# Patient Record
Sex: FEMALE | Race: WHITE | NOT HISPANIC OR LATINO | Employment: OTHER | URBAN - METROPOLITAN AREA
[De-identification: names, ages, dates, MRNs, and addresses within clinical notes are randomized per-mention and may not be internally consistent; named-entity substitution may affect disease eponyms.]

---

## 2023-05-02 ENCOUNTER — APPOINTMENT (OUTPATIENT)
Dept: GENERAL RADIOLOGY | Facility: CLINIC | Age: 43
End: 2023-05-02
Attending: EMERGENCY MEDICINE

## 2023-05-02 ENCOUNTER — HOSPITAL ENCOUNTER (EMERGENCY)
Facility: CLINIC | Age: 43
Discharge: HOME | End: 2023-05-03
Attending: EMERGENCY MEDICINE | Admitting: EMERGENCY MEDICINE

## 2023-05-02 DIAGNOSIS — I10 HYPERTENSION, UNSPECIFIED TYPE: ICD-10-CM

## 2023-05-02 DIAGNOSIS — R42 DIZZINESS: ICD-10-CM

## 2023-05-02 DIAGNOSIS — N30.01 ACUTE CYSTITIS WITH HEMATURIA: ICD-10-CM

## 2023-05-02 LAB
BASOPHILS # BLD AUTO: 0 10E3/UL (ref 0–0.2)
BASOPHILS NFR BLD AUTO: 0 %
EOSINOPHIL # BLD AUTO: 0.2 10E3/UL (ref 0–0.7)
EOSINOPHIL NFR BLD AUTO: 1 %
ERYTHROCYTE [DISTWIDTH] IN BLOOD BY AUTOMATED COUNT: 14.6 % (ref 10–15)
HCT VFR BLD AUTO: 37.8 % (ref 35–47)
HGB BLD-MCNC: 12.2 G/DL (ref 11.7–15.7)
IMM GRANULOCYTES # BLD: 0.1 10E3/UL
IMM GRANULOCYTES NFR BLD: 0 %
LYMPHOCYTES # BLD AUTO: 3.1 10E3/UL (ref 0.8–5.3)
LYMPHOCYTES NFR BLD AUTO: 19 %
MCH RBC QN AUTO: 25.3 PG (ref 26.5–33)
MCHC RBC AUTO-ENTMCNC: 32.3 G/DL (ref 31.5–36.5)
MCV RBC AUTO: 78 FL (ref 78–100)
MONOCYTES # BLD AUTO: 0.7 10E3/UL (ref 0–1.3)
MONOCYTES NFR BLD AUTO: 4 %
NEUTROPHILS # BLD AUTO: 12.6 10E3/UL (ref 1.6–8.3)
NEUTROPHILS NFR BLD AUTO: 76 %
NRBC # BLD AUTO: 0 10E3/UL
NRBC BLD AUTO-RTO: 0 /100
PLATELET # BLD AUTO: 354 10E3/UL (ref 150–450)
RBC # BLD AUTO: 4.83 10E6/UL (ref 3.8–5.2)
WBC # BLD AUTO: 16.7 10E3/UL (ref 4–11)

## 2023-05-02 PROCEDURE — 99285 EMERGENCY DEPT VISIT HI MDM: CPT | Mod: CS | Performed by: EMERGENCY MEDICINE

## 2023-05-02 PROCEDURE — 93005 ELECTROCARDIOGRAM TRACING: CPT | Performed by: EMERGENCY MEDICINE

## 2023-05-02 PROCEDURE — 83880 ASSAY OF NATRIURETIC PEPTIDE: CPT | Performed by: EMERGENCY MEDICINE

## 2023-05-02 PROCEDURE — 85025 COMPLETE CBC W/AUTO DIFF WBC: CPT | Performed by: EMERGENCY MEDICINE

## 2023-05-02 PROCEDURE — 71046 X-RAY EXAM CHEST 2 VIEWS: CPT | Mod: 26 | Performed by: RADIOLOGY

## 2023-05-02 PROCEDURE — 85379 FIBRIN DEGRADATION QUANT: CPT | Performed by: EMERGENCY MEDICINE

## 2023-05-02 PROCEDURE — 96374 THER/PROPH/DIAG INJ IV PUSH: CPT | Mod: 59 | Performed by: EMERGENCY MEDICINE

## 2023-05-02 PROCEDURE — 99285 EMERGENCY DEPT VISIT HI MDM: CPT | Mod: CS,25 | Performed by: EMERGENCY MEDICINE

## 2023-05-02 PROCEDURE — 87637 SARSCOV2&INF A&B&RSV AMP PRB: CPT | Performed by: EMERGENCY MEDICINE

## 2023-05-02 PROCEDURE — 71046 X-RAY EXAM CHEST 2 VIEWS: CPT

## 2023-05-02 PROCEDURE — C9803 HOPD COVID-19 SPEC COLLECT: HCPCS | Performed by: EMERGENCY MEDICINE

## 2023-05-02 PROCEDURE — 83735 ASSAY OF MAGNESIUM: CPT | Performed by: EMERGENCY MEDICINE

## 2023-05-02 PROCEDURE — 250N000011 HC RX IP 250 OP 636: Performed by: EMERGENCY MEDICINE

## 2023-05-02 PROCEDURE — 36415 COLL VENOUS BLD VENIPUNCTURE: CPT | Performed by: EMERGENCY MEDICINE

## 2023-05-02 PROCEDURE — 84484 ASSAY OF TROPONIN QUANT: CPT | Performed by: EMERGENCY MEDICINE

## 2023-05-02 PROCEDURE — 80053 COMPREHEN METABOLIC PANEL: CPT | Performed by: EMERGENCY MEDICINE

## 2023-05-02 PROCEDURE — 93010 ELECTROCARDIOGRAM REPORT: CPT | Performed by: EMERGENCY MEDICINE

## 2023-05-02 RX ORDER — LABETALOL HYDROCHLORIDE 5 MG/ML
10 INJECTION, SOLUTION INTRAVENOUS ONCE
Status: COMPLETED | OUTPATIENT
Start: 2023-05-02 | End: 2023-05-02

## 2023-05-02 RX ADMIN — LABETALOL HYDROCHLORIDE 10 MG: 5 INJECTION, SOLUTION INTRAVENOUS at 23:39

## 2023-05-02 ASSESSMENT — ACTIVITIES OF DAILY LIVING (ADL): ADLS_ACUITY_SCORE: 35

## 2023-05-03 ENCOUNTER — APPOINTMENT (OUTPATIENT)
Dept: CT IMAGING | Facility: CLINIC | Age: 43
End: 2023-05-03
Attending: EMERGENCY MEDICINE

## 2023-05-03 ENCOUNTER — APPOINTMENT (OUTPATIENT)
Dept: MRI IMAGING | Facility: CLINIC | Age: 43
End: 2023-05-03

## 2023-05-03 VITALS
SYSTOLIC BLOOD PRESSURE: 176 MMHG | HEART RATE: 101 BPM | TEMPERATURE: 98.4 F | RESPIRATION RATE: 16 BRPM | HEIGHT: 63 IN | OXYGEN SATURATION: 97 % | WEIGHT: 220 LBS | DIASTOLIC BLOOD PRESSURE: 120 MMHG | BODY MASS INDEX: 38.98 KG/M2

## 2023-05-03 LAB
ALBUMIN SERPL BCG-MCNC: 4.1 G/DL (ref 3.5–5.2)
ALBUMIN UR-MCNC: NEGATIVE MG/DL
ALP SERPL-CCNC: 80 U/L (ref 35–104)
ALT SERPL W P-5'-P-CCNC: 16 U/L (ref 10–35)
ANION GAP SERPL CALCULATED.3IONS-SCNC: 11 MMOL/L (ref 7–15)
APPEARANCE UR: CLEAR
AST SERPL W P-5'-P-CCNC: 15 U/L (ref 10–35)
ATRIAL RATE - MUSE: 105 BPM
BILIRUB SERPL-MCNC: 0.2 MG/DL
BILIRUB UR QL STRIP: NEGATIVE
BUN SERPL-MCNC: 14.3 MG/DL (ref 6–20)
CALCIUM SERPL-MCNC: 9.4 MG/DL (ref 8.6–10)
CHLORIDE SERPL-SCNC: 105 MMOL/L (ref 98–107)
COLOR UR AUTO: ABNORMAL
CREAT SERPL-MCNC: 0.52 MG/DL (ref 0.51–0.95)
D DIMER PPP FEU-MCNC: 0.32 UG/ML FEU (ref 0–0.5)
DEPRECATED HCO3 PLAS-SCNC: 24 MMOL/L (ref 22–29)
DIASTOLIC BLOOD PRESSURE - MUSE: NORMAL MMHG
FLUAV RNA SPEC QL NAA+PROBE: NEGATIVE
FLUBV RNA RESP QL NAA+PROBE: NEGATIVE
GFR SERPL CREATININE-BSD FRML MDRD: >90 ML/MIN/1.73M2
GLUCOSE SERPL-MCNC: 116 MG/DL (ref 70–99)
GLUCOSE UR STRIP-MCNC: NEGATIVE MG/DL
HGB UR QL STRIP: ABNORMAL
INTERPRETATION ECG - MUSE: NORMAL
KETONES UR STRIP-MCNC: NEGATIVE MG/DL
LEUKOCYTE ESTERASE UR QL STRIP: ABNORMAL
MAGNESIUM SERPL-MCNC: 2 MG/DL (ref 1.7–2.3)
NITRATE UR QL: NEGATIVE
NT-PROBNP SERPL-MCNC: 68 PG/ML (ref 0–450)
P AXIS - MUSE: 48 DEGREES
PH UR STRIP: 5.5 [PH] (ref 5–7)
POTASSIUM SERPL-SCNC: 3.3 MMOL/L (ref 3.4–5.3)
PR INTERVAL - MUSE: 172 MS
PROT SERPL-MCNC: 7.5 G/DL (ref 6.4–8.3)
QRS DURATION - MUSE: 86 MS
QT - MUSE: 340 MS
QTC - MUSE: 449 MS
R AXIS - MUSE: 12 DEGREES
RBC URINE: 17 /HPF
RSV RNA SPEC NAA+PROBE: NEGATIVE
SARS-COV-2 RNA RESP QL NAA+PROBE: NEGATIVE
SODIUM SERPL-SCNC: 140 MMOL/L (ref 136–145)
SP GR UR STRIP: 1.02 (ref 1–1.03)
SQUAMOUS EPITHELIAL: 1 /HPF
SYSTOLIC BLOOD PRESSURE - MUSE: NORMAL MMHG
T AXIS - MUSE: 47 DEGREES
TRANSITIONAL EPI: <1 /HPF
TROPONIN T SERPL HS-MCNC: 6 NG/L
TROPONIN T SERPL HS-MCNC: <6 NG/L
UROBILINOGEN UR STRIP-MCNC: NORMAL MG/DL
VENTRICULAR RATE- MUSE: 105 BPM
WBC URINE: 40 /HPF

## 2023-05-03 PROCEDURE — 70450 CT HEAD/BRAIN W/O DYE: CPT

## 2023-05-03 PROCEDURE — 70498 CT ANGIOGRAPHY NECK: CPT | Mod: 26 | Performed by: STUDENT IN AN ORGANIZED HEALTH CARE EDUCATION/TRAINING PROGRAM

## 2023-05-03 PROCEDURE — 36415 COLL VENOUS BLD VENIPUNCTURE: CPT | Performed by: EMERGENCY MEDICINE

## 2023-05-03 PROCEDURE — 70551 MRI BRAIN STEM W/O DYE: CPT | Mod: 26 | Performed by: RADIOLOGY

## 2023-05-03 PROCEDURE — 70496 CT ANGIOGRAPHY HEAD: CPT | Mod: 26 | Performed by: STUDENT IN AN ORGANIZED HEALTH CARE EDUCATION/TRAINING PROGRAM

## 2023-05-03 PROCEDURE — 81001 URINALYSIS AUTO W/SCOPE: CPT | Performed by: EMERGENCY MEDICINE

## 2023-05-03 PROCEDURE — A9585 GADOBUTROL INJECTION: HCPCS | Performed by: EMERGENCY MEDICINE

## 2023-05-03 PROCEDURE — 250N000009 HC RX 250: Performed by: EMERGENCY MEDICINE

## 2023-05-03 PROCEDURE — 70549 MR ANGIOGRAPH NECK W/O&W/DYE: CPT | Mod: 26 | Performed by: RADIOLOGY

## 2023-05-03 PROCEDURE — 250N000013 HC RX MED GY IP 250 OP 250 PS 637: Performed by: EMERGENCY MEDICINE

## 2023-05-03 PROCEDURE — 255N000002 HC RX 255 OP 636: Performed by: EMERGENCY MEDICINE

## 2023-05-03 PROCEDURE — 70496 CT ANGIOGRAPHY HEAD: CPT

## 2023-05-03 PROCEDURE — 250N000011 HC RX IP 250 OP 636: Performed by: EMERGENCY MEDICINE

## 2023-05-03 PROCEDURE — 87186 SC STD MICRODIL/AGAR DIL: CPT | Performed by: EMERGENCY MEDICINE

## 2023-05-03 PROCEDURE — 84484 ASSAY OF TROPONIN QUANT: CPT | Performed by: EMERGENCY MEDICINE

## 2023-05-03 PROCEDURE — 70551 MRI BRAIN STEM W/O DYE: CPT

## 2023-05-03 PROCEDURE — 70544 MR ANGIOGRAPHY HEAD W/O DYE: CPT

## 2023-05-03 PROCEDURE — 70498 CT ANGIOGRAPHY NECK: CPT

## 2023-05-03 PROCEDURE — 70549 MR ANGIOGRAPH NECK W/O&W/DYE: CPT

## 2023-05-03 RX ORDER — POTASSIUM CHLORIDE 1.5 G/1.58G
20 POWDER, FOR SOLUTION ORAL ONCE
Status: COMPLETED | OUTPATIENT
Start: 2023-05-03 | End: 2023-05-03

## 2023-05-03 RX ORDER — IOPAMIDOL 755 MG/ML
75 INJECTION, SOLUTION INTRAVASCULAR ONCE
Status: COMPLETED | OUTPATIENT
Start: 2023-05-03 | End: 2023-05-03

## 2023-05-03 RX ORDER — NITROFURANTOIN 25; 75 MG/1; MG/1
100 CAPSULE ORAL ONCE
Status: COMPLETED | OUTPATIENT
Start: 2023-05-03 | End: 2023-05-03

## 2023-05-03 RX ORDER — HYDROCHLOROTHIAZIDE 25 MG/1
25 TABLET ORAL DAILY
COMMUNITY

## 2023-05-03 RX ORDER — GADOBUTROL 604.72 MG/ML
10 INJECTION INTRAVENOUS ONCE
Status: COMPLETED | OUTPATIENT
Start: 2023-05-03 | End: 2023-05-03

## 2023-05-03 RX ORDER — NITROFURANTOIN 25; 75 MG/1; MG/1
100 CAPSULE ORAL 2 TIMES DAILY
Qty: 10 CAPSULE | Refills: 0 | Status: SHIPPED | OUTPATIENT
Start: 2023-05-03 | End: 2023-05-08

## 2023-05-03 RX ORDER — HYDROCHLOROTHIAZIDE 25 MG/1
25 TABLET ORAL DAILY
Qty: 30 TABLET | Refills: 0 | Status: SHIPPED | OUTPATIENT
Start: 2023-05-03 | End: 2023-06-02

## 2023-05-03 RX ADMIN — POTASSIUM CHLORIDE 20 MEQ: 1.5 POWDER, FOR SOLUTION ORAL at 01:17

## 2023-05-03 RX ADMIN — NITROFURANTOIN (MONOHYDRATE/MACROCRYSTALS) 100 MG: 75; 25 CAPSULE ORAL at 09:01

## 2023-05-03 RX ADMIN — IOPAMIDOL 75 ML: 755 INJECTION, SOLUTION INTRAVENOUS at 01:15

## 2023-05-03 RX ADMIN — GADOBUTROL 10 ML: 604.72 INJECTION INTRAVENOUS at 05:31

## 2023-05-03 RX ADMIN — SODIUM CHLORIDE, PRESERVATIVE FREE 90 ML: 5 INJECTION INTRAVENOUS at 01:15

## 2023-05-03 ASSESSMENT — ACTIVITIES OF DAILY LIVING (ADL)
ADLS_ACUITY_SCORE: 35

## 2023-05-03 NOTE — ED PROVIDER NOTES
"    [place] EMERGENCY DEPARTMENT ([facility])    5/02/23       ED PROVIDER NOTE      History     Chief Complaint   Patient presents with     Hypertension     The history is provided by the patient and medical records. The history is limited by a language barrier. A  was used (Official ).     Betty David is a 42 year old female with no known past medical history who presents to the ED for evaluation of hypertension.  Patient states that she is currently traveling, but had run out of her blood pressure medication and has been experiencing high blood pressure for a couple of days.  Upon arrival to triage, patient presents with a blood pressure of 208/134.  She also endorses experiencing dizziness, cough, sore throat, burning with urination, decreased urine frequency, and blurred vision.  She states that she also has a headache, has been feeling feverish, has chest pain that radiates to her neck.  Patient only takes blood pressure medication, no other medication changes. Denies recent diet changes.  She does not check her weight regularly.  Has not noted lower extremity edema.  No shortness of breath.  Denies numbness or weakness in the extremities.  No known allergies to medication.    Past Medical History  No past medical history on file.  No past surgical history on file.  No current outpatient medications on file.    No Known Allergies  Family History  No family history on file.  Social History       Past medical history, past surgical history, medications, allergies, family history, and social history were reviewed with the patient. No additional pertinent items.        Physical Exam   BP: (!) 208/134  Pulse: 113  Temp: 98.6  F (37  C)  Resp: 17  Height: 160 cm (5' 3\")  Weight: 99.8 kg (220 lb)  SpO2: 97 %  Physical Exam  General: patient is alert and oriented, fatigued appearing    Head: atraumatic and normocephalic   EENT: moist mucus membranes without tonsillar " erythema or exudates, pupils 2mm equal  round and reactive, extraocular movements intact without nystagmus, sclera anicteric  Neck: supple without meningismus  Cardiovascular: regular rate and rhythm, extremities warm and well perfused, trace bilateral lower extremity edema, 2+ radial and PT pulses bilaterally  Pulmonary: lungs clear to auscultation bilaterally   Abdomen: soft, non-tender   Musculoskeletal: normal range of motion   Neurological: alert and oriented, moving all extremities symmetrically, CN II-XII intact, strength 5/5 and symmetric in , elbow flexion/extension, hip flexion/extension, knee flexion/extension and ankle plantar/dorsiflexion, sensation to light touch in distal upper and lower extremities intact, normal gait  Skin: warm, dry       ED Course, Procedures, & Data      Procedures       ED Course Selections:        EKG Interpretation:      Interpreted by Britney Palafox MD  Time reviewed: 2303  Symptoms at time of EKG: chest pain   Rhythm: sinus tachycardia  Rate: Tachycardia  Axis: Normal  Ectopy: none  Conduction: normal  ST Segments/ T Waves: No acute ischemic changes  Q Waves: none  Comparison to prior: No old EKG available    Clinical Impression: sinus tachycardia                           No results found for any visits on 05/02/23.  Medications - No data to display  Labs Ordered and Resulted from Time of ED Arrival to Time of ED Departure - No data to display  No orders to display          Critical care was not performed.     Medical Decision Making  The patient's presentation was of high complexity (an acute health issue posing potential threat to life or bodily function).    The patient's evaluation involved:  ordering and/or review of 3+ test(s) in this encounter (see separate area of note for details)  discussion of management or test interpretation with another health professional (see separate area of note for details)    The patient's management necessitated moderate risk  (prescription drug management including medications given in the ED), high risk (a decision regarding hospitalization) and further care after sign-out to Dr. Flores (see their note for further management).      Assessment & Plan    Ms. David is a 42 year old female with no known past medical history who presents to the ED for evaluation of hypertension, headache, dizziness and chest pain.  She is noted to be quite hypertensive in the ED and tachycardic.  She is in no respiratory distress.  Given her symptoms of endorgan injury she was given 1 dose of IV labetalol with improvement in her blood pressure.  Her ECG shows no acute ischemic changes.  Initial troponin is 6, delta troponin pending.  She does have a leukocytosis of 16.7, creatinine is normal at 0.52, potassium was 3.3 and supplemented.  D-dimer is within normal limits.  Her chest x-ray shows no widening of the mediastinum, focal infiltrates, pulmonary edema or pneumothorax.  She did go for a CT head and CTA of the head and neck.  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  Neurology was consulted with recommendations pending.  Patient signed out to the overnight provider pending neurology consultation, delta troponin, UA and reevaluation.    I have reviewed the nursing notes. I have reviewed the findings, diagnosis, plan and need for follow up with the patient.    New Prescriptions    No medications on file     I, Annamarie Arce, am serving as a trained medical scribe to document services personally performed by Britney Olmstead MD, based on the provider's statements to me.     I, Britney Olmstead MD, was physically present and have reviewed and verified the accuracy of this note documented by Annamarie Arce.      Britney Olmstead MD  [facility] EMERGENCY DEPARTMENT  5/2/2023     Britney Olmstead MD  05/03/23 3417

## 2023-05-03 NOTE — CONSULTS
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    Chief Complaint: Hypertension     HPI  Betty David is a 42 year old female [place] woman (here in the [facility] visiting family) who presented to the [facility] emergency dept with a chief complaint of dizziness and headache.  She was found to be hypertensive with SBP > 200 mmHg, and she reported to the ED team that she ran out of her hydrochlorothiazide medication a few days prior to the presentation.  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  Neurology was consulted for this finding requesting further recommendations.       On my interview with Betty, she confirms the above history.  She states she has had high cholesterol in the past for which she was on a medication she cannot name, and that she is not sure how high her cholesterol ever was or is currently.  She denies hx of a-fib.  She does smoke, but says that this is very rare when she has an occasional cup of coffee.  She does not have a history of diabetes. No history of clotting disorder.  She states that in the past she has had episodes consistent with her current presentation when her blood pressure gets really high and she runs out of medication.  She also tells me that she takes aspirin but does not know why she takes it, she shows me her bottle of aspirin which has [place] riding on it but does indicate that it is 75 mg.  She does tell me that she has some diplopia, and that her dizziness is consistent with vertigo, however she says that since receiving her antihypertensive as needed  medication that this symptom has more or less subsided.    Imaging  CTA H/N  IMPRESSION:   HEAD CTA:   1.  No large vessel occlusion or hemodynamically significant stenosis.     NECK CTA:  1.  Short segment pseudoaneurysm of the dominant left vertebral artery at the V2/V3 junction. No dissection flap or flow-limiting stenosis.  2.  The carotid arteries and right vertebral artery are unremarkable.    CT H  IMPRESSION:  No definite acute intracranial abnormality. If the patient is experiencing an acute, focal, or ongoing neurologic deficit, an MRI may be indicated.     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  CT of the head negative for acute pathology including hemorrhage.  CTA was notable for a left pseudoaneurysm at the V2/V3 junction (series 8, image 356).  The patient does not have any focal deficits on neurologic exam including nystagmus or other ocular symptoms.  NIHSS is 0.  Due to this concerning imaging finding, we will pursue further work-up to evaluate for chronic versus acute vertebral dissection or other acute ischemic stroke.    Recommendations  - MRI brain without contrast (ordered for you)  - MRA wwo of the head and neck with fat saturation sequencing (ordered for you)    Patient Follow-up    - final recommendation pending work-up    Thank you for this consult. We will continue to follow.     The patient was discussed with Stroke Fellow, Dr. Hidalgo.  Pt's final imaging discussed with Dr. Kwong.    Zeb Curry,   Neurology Resident, PGY-2  ASCOM: *[id number]  _____________________________________________________    Clinically Significant Risk Factors Present on Admission        # Hypokalemia: Lowest K = 3.3 mmol/L in last 2 days, will replace as  "needed                # Obesity: Estimated body mass index is 38.97 kg/m  as calculated from the following:    Height as of this encounter: 1.6 m (5' 3\").    Weight as of this encounter: 99.8 kg (220 lb).              Past Medical History   History reviewed. No pertinent past medical history.  Past Surgical History   No past surgical history on file.  Medications     Home meds:  HCTZ  Aspirin 75mg (Indonesian formula)      Allergies   No Known Allergies  Family History   No family history on file.  Social History        Review of Systems   The 10 point Review of Systems is negative other than noted in the HPI.       PHYSICAL EXAMINATION   Temp:  [98.6  F (37  C)] 98.6  F (37  C)  Pulse:  [] 96  Resp:  [17-28] 18  BP: (154-208)/() 177/106  SpO2:  [97 %] 97 %    Neurologic  Mental Status:  alert, oriented x 3, follows commands, speech clear and fluent, naming and repetition normal  Cranial Nerves:  visual fields intact, PERRL, EOMI with normal smooth pursuit, facial sensation intact and symmetric, facial movements symmetric, hearing not formally tested but intact to conversation, palate elevation symmetric and uvula midline, no dysarthria, shoulder shrug strong bilaterally, tongue protrusion midline  Motor:  normal muscle tone and bulk, no abnormal movements, able to move all limbs spontaneously, strength 5/5 throughout upper and lower extremities, no pronator drift  Reflexes:  2+ and symmetric throughout, no clonus, toes down-going  Sensory:  light touch sensation intact and symmetric throughout upper and lower extremities, no extinction on double simultaneous stimulation   Coordination:  normal finger-to-nose and heel-to-shin bilaterally without dysmetria  Station/Gait:  deferred    Dysphagia Screen  N/A, no focal deficits.  Pt cleared for diet.    Stroke Scales    NIHSS  1a. Level of Consciousness 0-->Alert, keenly responsive   1b. LOC Questions 0-->Answers both questions correctly   1c. LOC Commands " 0-->Performs both tasks correctly   2.   Best Gaze 0-->Normal   3.   Visual 0-->No visual loss   4.   Facial Palsy 0-->Normal symmetrical movements   5a. Motor Arm, Left 0-->No drift, limb holds 90 (or 45) degrees for full 10 secs   5b. Motor Arm, Right 0-->No drift, limb holds 90 (or 45) degrees for full 10 secs   6a. Motor Leg, Left 0-->No drift, leg holds 30 degree position for full 5 secs   6b. Motor Leg, right 0-->No drift, leg holds 30 degree position for full 5 secs   7.   Limb Ataxia 0-->Absent   8.   Sensory 0-->Normal, no sensory loss   9.   Best Language 0-->No aphasia, normal   10. Dysarthria 0-->Normal   11. Extinction and Inattention  0-->No abnormality   Total 0 (05/03/23 0323)       Modified Sarahi Score (Pre-morbid)  0-No deficits     Imaging  I personally reviewed all imaging; relevant findings per HPI.    Labs Data   CBC  Recent Labs   Lab 05/02/23  2333   WBC 16.7*   RBC 4.83   HGB 12.2   HCT 37.8        Basic Metabolic Panel   Recent Labs   Lab 05/02/23  2333      POTASSIUM 3.3*   CHLORIDE 105   CO2 24   BUN 14.3   CR 0.52   *   BEAU 9.4     Liver Panel  Recent Labs   Lab 05/02/23  2333   PROTTOTAL 7.5   ALBUMIN 4.1   BILITOTAL 0.2   ALKPHOS 80   AST 15   ALT 16     INR  No lab results found.   Lipid Profile  No lab results found.  A1C  No lab results found.  Troponin    Recent Labs   Lab 05/02/23  2333   CTROPT 6          Stroke Consult Data Data   This was a non-emergent, non-telestroke consult.

## 2023-05-03 NOTE — ED TRIAGE NOTES
42 yr old female arrives via walk in w/ complaints of hypertension and dizziness. Pt states that she was taking hydrochlorothiazide but ran out of medication. Pt was supposed to follow up with a cardiologist but denies being able to go to the referral. Pt denies pain.

## 2023-05-03 NOTE — PROGRESS NOTES
Reviewed AVS with pt using interpretor. PIV removed. Pt discharging home with printed prescriptions. MD okay with pt's last BP. Pt to take BP meds after getting from pharmacy. This provider assisted pt to hospital pharmacy.

## 2023-05-03 NOTE — DISCHARGE INSTRUCTIONS
Please follow-up with your primary care provider in the next 2 to 3 days for further evaluation and follow-up.  Please continue your home blood pressure medication we did provide you with a 1 month (30-day) prescription of your medication but make sure you are following up with your doctor to get additional refills as well as to continue to monitor your blood pressure.  Please take antibiotics for your urinary tract infection as directed.  Please make sure you are drinking plenty of fluids. please follow-up with your primary care provider regarding your MRI findings.  You may need additional imaging and repeat MRI for further evaluation.Please return to the emergency department if you have any worsening symptoms.  It was a pleasure taking care of you today.  We hope you feel better soon.

## 2023-05-03 NOTE — ED PROVIDER NOTES
Sign Out Provider: Dr. Palafox    Sign Out Plan: 40-year-old 20 female with known past medical history of hypertension who presents with elevated blood pressure as she is currently out of her blood pressure medications as well as some dizziness and headache.  Patient had CT of the head and CTA of the head and neck which demonstrated short segment pseudoaneurysm of the dominant left vertebral artery at the V2/V3 junction.  Patient pending neurology consult at the time of signout.    Reassessment: Neurology evaluated patient and I discussed with neurology at 0316 AM.  Neurology would like to proceed with further imaging at this time to further evaluate pseudoaneurysm.  Would like MRI brain without contrast as well as MRA of the brain with and without contrast.  If positive for dissection/stroke would admit.  If negative would likely discharge home with continued blood pressure management (continue home blood pressure medication).    I personally reviewed MRI of the brain, MRA of the head/neck which are unremarkable with no acute intracranial process, no large vessel occlusion or significant stenosis or dissection.  Stable short segment pseudoaneurysm.  Prominent T1 hyperintense lesion along the posterior aspect of the sella.  Recommend MRI pituitary mass for follow-up.  I discussed with patient and recommend follow-up with her primary care provider regarding these findings and additional imaging.    I discussed the case with neurology, discussed MRI/MRA findings with neurology who recommend okay for discharge home with continued blood pressure management.  I reviewed urinalysis and given patient's urinary symptoms will start patient on Macrobid.  Recommend close follow-up with her primary care provider.  Patient understands and agrees with the plan.  Plan for discharge home with strict return precautions.      Disposition: Discharge         Traci Flores MD  05/03/23 0323       Traci Flores,  MD  05/03/23 0637

## 2023-05-04 LAB — BACTERIA UR CULT: ABNORMAL
